# Patient Record
Sex: FEMALE | Race: WHITE
[De-identification: names, ages, dates, MRNs, and addresses within clinical notes are randomized per-mention and may not be internally consistent; named-entity substitution may affect disease eponyms.]

---

## 2018-04-22 ENCOUNTER — HOSPITAL ENCOUNTER (OUTPATIENT)
Dept: HOSPITAL 24 - LAB | Age: 33
End: 2018-04-22
Attending: SPECIALIST
Payer: COMMERCIAL

## 2018-04-22 DIAGNOSIS — Z01.818: Primary | ICD-10-CM

## 2018-04-22 DIAGNOSIS — Z34.83: ICD-10-CM

## 2018-04-22 LAB
AMORPH SED URNS QL MICRO: (no result) /HPF
BACTERIA URNS QL MICRO: (no result) /HPF
BASOPHILS # BLD AUTO: 0.1 X10^3/UL (ref 0–0.1)
BASOPHILS NFR BLD AUTO: 0.5 % (ref 0.2–1)
BILIRUB UR QL STRIP.AUTO: NEGATIVE
BUN SERPL-MCNC: 9 MG/DL (ref 7–18)
CALCIUM ALBUM COR SERPL-SCNC: 136 MMOL/L (ref 136–145)
CALCIUM SERPL-MCNC: 7.9 MG/DL (ref 8.5–10.1)
CHLORIDE SERPL-SCNC: 103 MMOL/L (ref 98–107)
CLARITY UR: CLEAR
CO2 SERPL-SCNC: 21.2 MMOL/L (ref 21–32)
COLOR UR AUTO: YELLOW
CREAT SERPL-MCNC: 0.73 MG/DL (ref 0.55–1.02)
EGFR  BLACK RACES: > 60 (ref 60–?)
EOSINOPHIL # BLD AUTO: 0.1 X10^3/UL (ref 0–0.2)
EOSINOPHIL NFR BLD AUTO: 0.5 % (ref 0.9–2.9)
ERYTHROCYTE [DISTWIDTH] IN BLOOD BY AUTOMATED COUNT: 14.6 % (ref 11.6–16.5)
GLUCOSE UR QL STRIP.AUTO: NEGATIVE
HCT VFR BLD AUTO: 32.5 % (ref 36–47)
HGB BLD-MCNC: 11.3 G/DL (ref 12–16)
KETONES UR QL STRIP.AUTO: NEGATIVE
LEUKOCYTE ESTERASE UR QL STRIP.AUTO: (no result)
LYMPHOCYTES # BLD AUTO: 2 X10^3/UL (ref 1.3–2.9)
LYMPHOCYTES NFR BLD AUTO: 18.1 % (ref 21–51)
MCH RBC QN AUTO: 29.2 PG (ref 27–34)
MCHC RBC AUTO-ENTMCNC: 34.8 G/DL (ref 33–35)
MCV RBC AUTO: 83.8 FL (ref 80–100)
MONOCYTES # BLD AUTO: 0.8 X10^3/UL (ref 0.3–0.8)
MONOCYTES NFR BLD AUTO: 7.2 % (ref 0–13)
NEUTROPHILS # BLD AUTO: 8.1 X10^3/UL (ref 2.2–4.8)
NEUTROPHILS NFR BLD AUTO: 73.7 % (ref 42–75)
NITRITE UR QL STRIP.AUTO: NEGATIVE
PH UR STRIP.AUTO: 7 [PH] (ref 5–8)
PLATELET # BLD: 216 X10^3/UL (ref 150–450)
PMV BLD AUTO: 8.1 FL (ref 7.4–11)
PROT UR QL STRIP.AUTO: NEGATIVE
RBC # BLD AUTO: 3.88 X10^6/UL (ref 3.5–5.4)
RBC # UR STRIP.AUTO: NEGATIVE /UL
RBC #/AREA URNS HPF: (no result) /HPF
RENAL EPI CELLS URNS QL MICRO: (no result) /HPF
SODIUM SERPL-SCNC: 135 MMOL/L (ref 136–145)
SP GR UR STRIP.AUTO: 1.01 (ref 1–1.03)
SQUAMOUS URNS QL MICRO: (no result) /HPF
UROBILINOGEN UR QL STRIP.AUTO: NORMAL
WBC NRBC COR # BLD AUTO: 10.9 X10^3/UL (ref 3.6–10)

## 2018-04-22 PROCEDURE — 85025 COMPLETE CBC W/AUTO DIFF WBC: CPT

## 2018-04-22 PROCEDURE — 86900 BLOOD TYPING SEROLOGIC ABO: CPT

## 2018-04-22 PROCEDURE — 80048 BASIC METABOLIC PNL TOTAL CA: CPT

## 2018-04-22 PROCEDURE — 86901 BLOOD TYPING SEROLOGIC RH(D): CPT

## 2018-04-22 PROCEDURE — 86850 RBC ANTIBODY SCREEN: CPT

## 2018-04-22 PROCEDURE — 80307 DRUG TEST PRSMV CHEM ANLYZR: CPT

## 2018-04-22 PROCEDURE — G0434 DRUG SCREEN MULTI DRUG CLASS: HCPCS

## 2018-04-22 PROCEDURE — 86592 SYPHILIS TEST NON-TREP QUAL: CPT

## 2018-04-22 PROCEDURE — 36415 COLL VENOUS BLD VENIPUNCTURE: CPT

## 2018-04-22 PROCEDURE — 81001 URINALYSIS AUTO W/SCOPE: CPT

## 2018-04-23 ENCOUNTER — HOSPITAL ENCOUNTER (INPATIENT)
Dept: HOSPITAL 24 - LD | Age: 33
LOS: 2 days | Discharge: HOME | End: 2018-04-25
Attending: SPECIALIST | Admitting: SPECIALIST
Payer: COMMERCIAL

## 2018-04-23 DIAGNOSIS — Z23: ICD-10-CM

## 2018-04-23 DIAGNOSIS — O61.8: ICD-10-CM

## 2018-04-23 DIAGNOSIS — Z3A.39: ICD-10-CM

## 2018-04-23 PROCEDURE — 85018 HEMOGLOBIN: CPT

## 2018-04-23 PROCEDURE — 85014 HEMATOCRIT: CPT

## 2018-04-23 PROCEDURE — S0197 PRENATAL VITAMINS 30 DAY: HCPCS

## 2018-04-23 PROCEDURE — 36415 COLL VENOUS BLD VENIPUNCTURE: CPT

## 2018-04-23 PROCEDURE — 3E0234Z INTRODUCTION OF SERUM, TOXOID AND VACCINE INTO MUSCLE, PERCUTANEOUS APPROACH: ICD-10-PCS | Performed by: SPECIALIST

## 2018-04-23 PROCEDURE — S0020 INJECTION, BUPIVICAINE HYDRO: HCPCS

## 2018-04-23 RX ADMIN — RANITIDINE HYDROCHLORIDE SCH MG: 150 TABLET, FILM COATED ORAL at 21:21

## 2018-04-23 RX ADMIN — SODIUM CHLORIDE SCH: 9 INJECTION, SOLUTION INTRAMUSCULAR; INTRAVENOUS; SUBCUTANEOUS at 21:21

## 2018-04-23 NOTE — DR.OB
OB Quick Note





- Assessment/Plan


Assessment/Plan: 





L&D   18 at 7:00am





S-No complaint.


O-Afebrile, VSS


   EWC=517 with good LTV, +accel, no decel.


   CTX=mild,irreg.


   CVX=2cm/50%/-1/VTX   AROM with clear fluid.  IUPC and FSE placed.


A-IUP at 39 2/7 weeks for induction


P-Begin pitocin induction


   Anticipate .

## 2018-04-23 NOTE — DR.OB
OB Quick Note





- Assessment/Plan


Assessment/Plan: 





L&D   18 at 12:05pm   Pitocin=6mu/min.





S-No complaint.  s/p epidural.  Episode of prolonged late decel x 7 min. that 

resolved.  s/p epidural.


O-Afebrile,VSS


   RED=329 with good LTV, +accel, mild variables.  Occ. late decel--mild.


   CTX=q 2-4 min., about 45-70mmHg


   CVX=3cm/50%/-1/VTX


A-IUP at 39 2/7 weeks for induction


P-Cont. pitocin induction


   Anticipate

## 2018-04-24 LAB
HCT VFR BLD AUTO: 27.6 % (ref 36–47)
HGB BLD-MCNC: 9.5 G/DL (ref 12–16)

## 2018-04-24 RX ADMIN — DOCUSATE SODIUM SCH MG: 100 CAPSULE, LIQUID FILLED ORAL at 20:48

## 2018-04-24 RX ADMIN — RANITIDINE HYDROCHLORIDE SCH MG: 150 TABLET, FILM COATED ORAL at 20:48

## 2018-04-24 RX ADMIN — Medication SCH TAB: at 08:25

## 2018-04-24 RX ADMIN — MUPIROCIN SCH APPLIC: 20 OINTMENT TOPICAL at 13:08

## 2018-04-24 RX ADMIN — SODIUM CHLORIDE SCH ML: 9 INJECTION, SOLUTION INTRAMUSCULAR; INTRAVENOUS; SUBCUTANEOUS at 13:08

## 2018-04-24 RX ADMIN — MUPIROCIN SCH APPLIC: 20 OINTMENT TOPICAL at 22:52

## 2018-04-24 RX ADMIN — SODIUM CHLORIDE SCH ML: 9 INJECTION, SOLUTION INTRAMUSCULAR; INTRAVENOUS; SUBCUTANEOUS at 05:27

## 2018-04-24 RX ADMIN — DOCUSATE SODIUM SCH MG: 100 CAPSULE, LIQUID FILLED ORAL at 08:25

## 2018-04-24 RX ADMIN — SODIUM CHLORIDE SCH ML: 9 INJECTION, SOLUTION INTRAMUSCULAR; INTRAVENOUS; SUBCUTANEOUS at 22:52

## 2018-04-24 RX ADMIN — IBUPROFEN PRN MG: 800 TABLET, FILM COATED ORAL at 15:15

## 2018-04-24 RX ADMIN — RANITIDINE HYDROCHLORIDE SCH MG: 150 TABLET, FILM COATED ORAL at 08:25

## 2018-04-25 VITALS — DIASTOLIC BLOOD PRESSURE: 60 MMHG | SYSTOLIC BLOOD PRESSURE: 104 MMHG

## 2018-04-25 RX ADMIN — MUPIROCIN SCH APPLIC: 20 OINTMENT TOPICAL at 05:06

## 2018-04-25 RX ADMIN — DOCUSATE SODIUM SCH MG: 100 CAPSULE, LIQUID FILLED ORAL at 08:19

## 2018-04-25 RX ADMIN — Medication SCH TAB: at 08:19

## 2018-04-25 RX ADMIN — IBUPROFEN PRN MG: 800 TABLET, FILM COATED ORAL at 12:18

## 2018-04-25 RX ADMIN — RANITIDINE HYDROCHLORIDE SCH MG: 150 TABLET, FILM COATED ORAL at 08:19

## 2018-04-25 RX ADMIN — SODIUM CHLORIDE SCH ML: 9 INJECTION, SOLUTION INTRAMUSCULAR; INTRAVENOUS; SUBCUTANEOUS at 05:06
